# Patient Record
Sex: FEMALE | Race: ASIAN
[De-identification: names, ages, dates, MRNs, and addresses within clinical notes are randomized per-mention and may not be internally consistent; named-entity substitution may affect disease eponyms.]

---

## 2018-12-23 ENCOUNTER — HOSPITAL ENCOUNTER (EMERGENCY)
Dept: HOSPITAL 97 - ER | Age: 38
Discharge: HOME | End: 2018-12-23
Payer: COMMERCIAL

## 2018-12-23 DIAGNOSIS — N93.8: Primary | ICD-10-CM

## 2018-12-23 DIAGNOSIS — Z79.3: ICD-10-CM

## 2018-12-23 DIAGNOSIS — N85.9: ICD-10-CM

## 2018-12-23 LAB
BUN BLD-MCNC: 13 MG/DL (ref 7–18)
GLUCOSE SERPLBLD-MCNC: 108 MG/DL (ref 74–106)
HCT VFR BLD CALC: 32 % (ref 36–45)
LYMPHOCYTES # SPEC AUTO: 1.2 K/UL (ref 0.7–4.9)
PMV BLD: 8.2 FL (ref 7.6–11.3)
POTASSIUM SERPL-SCNC: 3.7 MMOL/L (ref 3.5–5.1)
RBC # BLD: 3.46 M/UL (ref 3.86–4.86)

## 2018-12-23 PROCEDURE — 81025 URINE PREGNANCY TEST: CPT

## 2018-12-23 PROCEDURE — 80048 BASIC METABOLIC PNL TOTAL CA: CPT

## 2018-12-23 PROCEDURE — 85025 COMPLETE CBC W/AUTO DIFF WBC: CPT

## 2018-12-23 PROCEDURE — 36415 COLL VENOUS BLD VENIPUNCTURE: CPT

## 2018-12-23 PROCEDURE — 81003 URINALYSIS AUTO W/O SCOPE: CPT

## 2018-12-23 PROCEDURE — 76830 TRANSVAGINAL US NON-OB: CPT

## 2018-12-23 PROCEDURE — 99284 EMERGENCY DEPT VISIT MOD MDM: CPT

## 2018-12-23 NOTE — RAD REPORT
EXAM DESCRIPTION:  US - Transvaginal Study Probe - 12/23/2018 9:20 am

 

CLINICAL HISTORY:  VAGINAL BLEEDING

Pelvic pain.

 

COMPARISON:  [facility] dated 9/6/2010

 

FINDINGS:  The uterus is normal in size, shape and echotexture. The uterus measures 8.4 x 5.1 x 4.9 c
m. An oblong 1-2 cm slightly complex circumscribed lesion is seen in the cervical canal.

 

The endometrial stripe measures 11 mm, mildly thickened.

 

Both ovaries are normal in size, shape and echotexture.  The right ovary measures 2.4 x 2.2 x 1.1 cm.
  The left ovary measures 2.3 x 1.2 x 1.2 cm. No ovarian or parovarian lesions. No adnexal masses.

 

Normal Doppler blood flow was demonstrated to both ovaries.

 

No significant pelvic ascites.

 

IMPRESSION:  An oblong 1-2 cm slightly complex rounded lesion in the cervical canal is noted.Differen
tial would include a cervical fibroid, polyp or mass. Followup direct visualization may be of value.

## 2018-12-23 NOTE — ER
Nurse's Notes                                                                                     

 [facility]                                                                

Name: Isabel Dietz                                                                               

Age: 38 yrs                                                                                       

Sex: Female                                                                                       

: 1980                                                                                   

MRN: [id number]                                                                                   

Arrival Date: 2018                                                                          

Time: 07:33                                                                                       

Account#: [id number]                                                                            

Bed 13                                                                                            

Private MD: Annia Rojas H                                                                    

Diagnosis: Dysfunctional Uterine Bleeding;Uterine Mass, NOS                                       

                                                                                                  

Presentation:                                                                                     

                                                                                             

07:47 Presenting complaint: Patient states: was put on birth control pills after having       iw  

      painful periods, started having bleeding for a month, was told by Dr. Hare to stop     

      pills for one week, yesterday was having heavier bleeding and mild cramping. Transition     

      of care: patient was not received from another setting of care. Onset of symptoms was       

      2018. Risk Assessment: Do you want to hurt yourself or someone else? Patient       

      reports no desire to harm self or others. Initial Sepsis Screen: Does the patient meet      

      any 2 criteria? No. Patient's initial sepsis screen is negative. Does the patient have      

      a suspected source of infection? No. Patient's initial sepsis screen is negative. Care      

      prior to arrival: None.                                                                     

07:47 Method Of Arrival: Ambulatory                                                           iw  

07:47 Acuity: MALCOLM 3                                                                           iw  

                                                                                                  

OB/GYN:                                                                                           

07:51 LMP 2018                                                                          iw  

                                                                                                  

Historical:                                                                                       

- Allergies:                                                                                      

07:51 NKA;                                                                                    iw  

- Home Meds:                                                                                      

07:51 L norgest/e.estradiol-e.estrad 0.15 mg-30 mcg (84)/10 mcg (7) oral 3MPk 1 tab once      iw  

      daily [Active];                                                                             

- PMHx:                                                                                           

07:51 None;                                                                                   iw  

                                                                                                  

- Immunization history:: Adult Immunizations not up to date.                                      

- Social history:: Smoking status: Patient/guardian denies using tobacco.                         

- Ebola Screening: : Patient negative for fever greater than or equal to 101.5 degrees            

  Fahrenheit, and additional compatible Ebola Virus Disease symptoms Patient denies               

  exposure to infectious person Patient denies travel to an Ebola-affected area in the            

  21 days before illness onset No symptoms or risks identified at this time.                      

                                                                                                  

                                                                                                  

Screenin:09 Abuse screen: Denies threats or abuse. Denies injuries from another. Nutritional        jl7 

      screening: No deficits noted. Tuberculosis screening: No symptoms or risk factors           

      identified. Fall Risk IV access (20 points). Total Baker Fall Scale indicates No Risk       

      (0-24 pts).                                                                                 

                                                                                                  

Assessment:                                                                                       

08:09 General: Appears in no apparent distress. uncomfortable, Behavior is calm, cooperative, jl7 

      appropriate for age. Pain: Complains of pain in suprapubic area, right lower quadrant       

      and left lower quadrant Pain does not radiate. Pain currently is 0 out of 10 on a pain      

      scale. at worst was 8 out of 10 on a pain scale. Quality of pain is described as            

      crampy, Is intermittent. Neuro: Level of Consciousness is awake, alert, obeys commands,     

      Oriented to person, place, time, situation. Cardiovascular: Patient's skin is warm and      

      dry. Respiratory: Airway is patent Respiratory effort is even, unlabored, Respiratory       

      pattern is regular, symmetrical. GI: No signs and/or symptoms were reported involving       

      the gastrointestinal system. : pt denies burning with urination Reports vaginal           

      bleeding that is bright red, with clots, heavy flow since x 1 month. EENT: Derm: Skin       

      is pink, warm \T\ dry. Musculoskeletal: No signs and/or symptoms reported regarding the     

      musculoskeletal system.                                                                     

09:00 Reassessment: Patient appears in no apparent distress at this time. No changes from     jl7 

      previously documented assessment. Patient and/or family updated on plan of care and         

      expected duration. Pain level reassessed. Patient is alert, oriented x 3, equal             

      unlabored respirations, skin warm/dry/pink.                                                 

10:00 Reassessment: Patient appears in no apparent distress at this time. Patient and/or      jl7 

      family updated on plan of care and expected duration. Pain level reassessed. Patient is     

      alert, oriented x 3, equal unlabored respirations, skin warm/dry/pink.                      

11:00 Reassessment: Patient appears in no apparent distress at this time. Patient and/or      jl7 

      family updated on plan of care and expected duration. Pain level reassessed. Patient is     

      alert, oriented x 3, equal unlabored respirations, skin warm/dry/pink.                      

                                                                                                  

Vital Signs:                                                                                      

07:51  / 63; Pulse 79; Resp 16; Temp 97.8(TE); Pulse Ox 100% on R/A; Weight 48.99 kg;   iw  

      Height 5 ft. 1 in. (154.94 cm); Pain 0/10;                                                  

09:18 BP 91 / 58; Pulse 76; Resp 12; Pulse Ox 98% on R/A;                                     mh5 

12:07 BP 98 / 68; Pulse 75; Resp 16 S; Pulse Ox 99% on R/A;                                   jl7 

07:51 Body Mass Index 20.41 (48.99 kg, 154.94 cm)                                               

                                                                                                  

ED Course:                                                                                        

07:33 Patient arrived in ED.                                                                  mr  

07:33 Annia Rojas DO is Private Physician.                                               mr  

07:45 Agus Laboy RN is Primary Nurse.                                                      jl7 

07:45 Jose Hdez MD is Attending Physician.                                             ps1 

07:50 Triage completed.                                                                       iw  

07:51 Arm band placed on.                                                                     iw  

08:09 Patient has correct armband on for positive identification. Bed in low position. Call   jl7 

      light in reach. Side rails up X 1. Pulse ox on. NIBP on. Warm blanket given.                

08:09 Initial lab(s) drawn, by me, sent to lab. Inserted saline lock: 20 gauge in right       jl7 

      antecubital area, using aseptic technique. Blood collected.                                 

09:17 Ultrasound completed. Patient tolerated well.                                           sg3 

09:18 Transvaginal Study Probe In Process Unspecified.                                        EDMS

09:34 Urine Pregnancy--Ancillary Sent.                                                        5 

09:34 Urine Dipstick-Ancillary Sent.                                                          5 

09:43 Urine Pregnancy--Ancillary (enter results) Sent.                                        5 

09:43 Urine Dipstick--Ancillary (enter results) Sent.                                         5 

11:32 Robert Hare MD is Referral Physician.                                           ps1 

12:07 No provider procedures requiring assistance completed. IV discontinued, intact,         jl7 

      bleeding controlled, No redness/swelling at site. Pressure dressing applied.                

                                                                                                  

Administered Medications:                                                                         

No medications were administered                                                                  

                                                                                                  

                                                                                                  

Outcome:                                                                                          

11:34 Discharge ordered by MD.                                                                ps1 

12:07 Discharged to home ambulatory.                                                          jl7 

12:07 Condition: stable                                                                           

12:07 Discharge instructions given to patient, Instructed on discharge instructions, follow       

      up and referral plans. medication usage, Demonstrated understanding of instructions,        

      follow-up care, medications, Prescriptions given X 1.                                       

12:12 Patient left the ED.                                                                    jl7 

                                                                                                  

Signatures:                                                                                       

Dispatcher [employer]                           Janay Corodva                                                                                    

Maryam Maurice, RN                     RN                                                      

Monique Jerez                              5                                                  

Agus Laboy, PORTILLO                        RN   7                                                  

Jose Hdez MD MD   ps1                                                  

Desiree Zhao                               sg3                                                  

                                                                                                  

**************************************************************************************************

## 2018-12-23 NOTE — EDPHYS
Physician Documentation                                                                           

 [facility]                                                                

Name: Isabel Dietz                                                                               

Age: 38 yrs                                                                                       

Sex: Female                                                                                       

: 1980                                                                                   

MRN: [id number]                                                                                   

Arrival Date: 2018                                                                          

Time: 07:33                                                                                       

Account#: [id number]                                                                            

Bed 13                                                                                            

Private MD: Annia Rojas H                                                                    

ED Physician Jose Hdez                                                                      

HPI:                                                                                              

                                                                                             

07:57 This 38 yrs old  Female presents to ER via Ambulatory with complaints of Vaginal   ps1 

      Bleeding.                                                                                   

07:57 Pt of Dr. Hare was started on a levonorgestrel taper. Improved initially, now      ps1 

      having increased bleeding. Worse over the last couple of days. Now changing pads every      

      2 hours. States she has been lightheaded. Non-painful. .                                    

                                                                                                  

OB/GYN:                                                                                           

07:51 LMP 2018                                                                          iw  

                                                                                                  

Historical:                                                                                       

- Allergies:                                                                                      

07:51 NKA;                                                                                    iw  

- Home Meds:                                                                                      

07:51 L norgest/e.estradiol-e.estrad 0.15 mg-30 mcg (84)/10 mcg (7) oral 3MPk 1 tab once      iw  

      daily [Active];                                                                             

- PMHx:                                                                                           

07:51 None;                                                                                   iw  

                                                                                                  

- Immunization history:: Adult Immunizations not up to date.                                      

- Social history:: Smoking status: Patient/guardian denies using tobacco.                         

- Ebola Screening: : Patient negative for fever greater than or equal to 101.5 degrees            

  Fahrenheit, and additional compatible Ebola Virus Disease symptoms Patient denies               

  exposure to infectious person Patient denies travel to an Ebola-affected area in the            

  21 days before illness onset No symptoms or risks identified at this time.                      

                                                                                                  

                                                                                                  

ROS:                                                                                              

07:57 Constitutional: Negative for fever, chills, and weight loss, Eyes: Negative for injury, ps1 

      pain, redness, and discharge, Cardiovascular: Negative for chest pain, palpitations,        

      and edema, Respiratory: Negative for shortness of breath, cough, wheezing, and              

      pleuritic chest pain, Abdomen/GI: Negative for abdominal pain, nausea, vomiting,            

      diarrhea, and constipation, Back: Negative for injury and pain, MS/Extremity: Negative      

      for injury and deformity, Skin: Negative for injury, rash, and discoloration, Psych:        

      Negative for depression, anxiety, suicide ideation, homicidal ideation, and                 

      hallucinations.                                                                             

07:57 : Positive for vaginal bleeding.                                                          

07:57 Neuro: Positive for lightheaded.                                                            

                                                                                                  

Exam:                                                                                             

07:57 Constitutional:  This is a well developed, well nourished patient who is awake, alert,  ps1 

      and in no acute distress. Head/Face:  Normocephalic, atraumatic. Eyes:  Pupils equal        

      round and reactive to light, extra-ocular motions intact.  Lids and lashes normal.          

      Conjunctiva and sclera are non-icteric and not injected. Chest/axilla:  Normal chest        

      wall appearance and motion.  Nontender with no deformity.  No lesions are appreciated.      

      Cardiovascular:  Regular rate and rhythm.  No gallops, murmurs, or rubs.  Normal PMI,       

      no JVD.  No pulse deficits. Respiratory:  Lungs have equal breath sounds bilaterally,       

      clear to auscultation and percussion.  No rales, rhonchi or wheezes noted.  No              

      increased work of breathing, no retractions or nasal flaring. Abdomen/GI:  Soft,            

      non-tender, with normal bowel sounds.  No distension or tympany.  No guarding or            

      rebound.  No evidence of tenderness throughout. Skin:  Warm, dry with normal turgor.        

      Normal color with no rashes, no lesions, and no evidence of cellulitis. MS/ Extremity:      

      Pulses equal, no cyanosis.  Neurovascular intact.  Full, normal range of motion. Neuro:     

       Awake and alert, GCS 15, oriented to person, place, time, and situation.  Cranial          

      nerves II-XII grossly intact. Sensory grossly intact. Psych:  Awake, alert, with            

      orientation to person, place and time.  Behavior, mood, and affect are within normal        

      limits.                                                                                     

                                                                                                  

Vital Signs:                                                                                      

07:51  / 63; Pulse 79; Resp 16; Temp 97.8(TE); Pulse Ox 100% on R/A; Weight 48.99 kg;   iw  

      Height 5 ft. 1 in. (154.94 cm); Pain 0/10;                                                  

09:18 BP 91 / 58; Pulse 76; Resp 12; Pulse Ox 98% on R/A;                                     mh5 

12:07 BP 98 / 68; Pulse 75; Resp 16 S; Pulse Ox 99% on R/A;                                   jl7 

07:51 Body Mass Index 20.41 (48.99 kg, 154.94 cm)                                             iw  

                                                                                                  

MDM:                                                                                              

08:19 Patient medically screened.                                                             ps1 

11:35 Data reviewed: vital signs, nurses notes, lab test result(s), radiologic studies, and   ps1 

      as a result, I will discharge patient. ED course: patient has undifferentiated mass in      

      uterus, likely fibroid. Needs hysteroscopy. Will rx Megace for DUB. Refer back to           

      Ananya. Hgb stable. Stable for discharge. .                                              

                                                                                                  

                                                                                             

07:57 Order name: Basic Metabolic Panel; Complete Time: 08:22                                 ps1 

                                                                                             

07:57 Order name: CBC with Diff; Complete Time: 08:19                                         ps1 

                                                                                             

08:25 Order name: Urine Dipstick--Ancillary (enter results)                                   ag  

                                                                                             

08:25 Order name: Urine Pregnancy--Ancillary (enter results)                                  ag  

                                                                                             

08:25 Order name: Urine Dipstick-Ancillary                                                    EDMS

                                                                                             

08:25 Order name: Urine Pregnancy--Ancillary                                                  EDMS

                                                                                             

07:57 Order name: Urine Pregnancy Test (obtain specimen); Complete Time: 08:25                ps1 

                                                                                             

07:57 Order name: IV Saline Lock; Complete Time: 08:06                                        ps1 

                                                                                             

07:57 Order name: Labs collected and sent; Complete Time: 08:06                               ps1 

                                                                                             

07:57 Order name: NPO; Complete Time: 08:06                                                   ps1 

                                                                                             

07:57 Order name: Urine Dipstick-Ancillary (obtain specimen); Complete Time: 08:25            ps1 

                                                                                             

08:13 Order name: Transvaginal Study Probe; Complete Time: 11:28                              EDMS

                                                                                                  

Administered Medications:                                                                         

No medications were administered                                                                  

                                                                                                  

                                                                                                  

Disposition:                                                                                      

18 11:34 Discharged to Home. Impression: Dysfunctional Uterine Bleeding, Uterine Mass,      

  NOS.                                                                                            

- Condition is Stable.                                                                            

- Discharge Instructions: Uterine Fibroids, Dysfunctional Uterine Bleeding.                       

                                                                                                  

- Medication Reconciliation Form, Thank You Letter, Antibiotic Education, Prescription            

  Opioid Use form.                                                                                

- Follow up: Robert Hare MD; When: 1 week; Reason: Further diagnostic work-up,            

  Recheck today's complaints, Continuance of care. Follow up: Emergency Department;               

  When: As needed; Reason: Worsening of condition.                                                

- Problem is an ongoing problem.                                                                  

- Symptoms have worsened.                                                                         

                                                                                                  

                                                                                                  

                                                                                                  

Signatures:                                                                                       

Dispatcher [employer]                           EDMS                                                 

Maryam Maurice, RN                     Agus Deshpande RN                        RN   jl7                                                  

Jose Hdez MD MD   ps1                                                  

                                                                                                  

Corrections: (The following items were deleted from the chart)                                    

08:13 07:57 Transvaginal Ob+US.RAD.BRZ ordered. EDMS                                          EDMS

12:12 11:34 2018 11:34 Discharged to Home. Impression: Dysfunctional Uterine Bleeding;  jl7 

      Uterine Mass, NOS. Condition is Stable. Forms are Medication Reconciliation Form, Thank     

      You Letter, Antibiotic Education, Prescription Opioid Use. Follow up: Robert Hare; When: 1 week; Reason: Further diagnostic work-up, Recheck today's                

      complaints, Continuance of care. Follow up: Emergency Department; When: As needed;          

      Reason: Worsening of condition. Problem is an ongoing problem. Symptoms have worsened.      

      ps1                                                                                         

                                                                                                  

**************************************************************************************************

## 2018-12-27 LAB
HCT VFR BLD CALC: 25.7 % (ref 36–45)
LYMPHOCYTES # SPEC AUTO: 1.7 K/UL (ref 0.7–4.9)
PMV BLD: 8.3 FL (ref 7.6–11.3)
RBC # BLD: 2.78 M/UL (ref 3.86–4.86)

## 2018-12-27 NOTE — PREOPHP
Date of Admission:  2018



History Of Present Illness:  Ms. Dietz is a 38-year-old [place] female,  3, para 3-0-0-3, 
who will be admitted for treatment of menorrhagia, prolapsed uterine leiomyomata.  Ms. Dietz has had
 some difficulties with increasing cramping and discomfort, was placed on oral contraceptives for thi
s, did not seem to respond, went to the emergency room.  Ultrasound through them showed now a prolaps
ed uterine leiomyomata.  This is confirmed on office exam.  Because of this, she will be admitted for
 excision of prolapsed leiomyomata.  Hysteroscopy, D and C of the uterine endometrium.



Past Medical History:  Includes 3 prior vaginal deliveries without complications.  She has also had b
reast augmentation surgery and no other hospitalizations, accidents, illnesses, injuries.  She has be
en on oral contraceptives recently and Megace through the emergency room, although this has not made 
much of a difference in her bleeding.  She is started on iron supplementation recently.



Allergies:  SHE HAS NO KNOWN ALLERGIES.



Social History:  She does not smoke.



Family History:  Noncontributory.



Review of Systems:

She reports no recent cough, cold, fever, or chills.  No recent nausea or vomiting.  She denies seein
g breast lumps or breast knots.  She denies any bowel or bladder issues.  Other than vaginal bleeding
, she has no other major complaints.



Physical Examination:

General:  Petite [place] female, in no apparent distress. 

Neck:  Supple without adenopathy or thyromegaly. 

Lungs:  Clear. 

Cardiac:  Regular rate and rhythm without murmurs. 

Breasts:  Not examined. 

Abdomen:  Nontender without organosplenomegaly. 

Pelvic:  Normal female external genitalia.  Vaginal wall is pink, rugated.  Blood present.  Speculum 
exam reveals prolapsed uterine leiomyomata.  Bimanual, no abnormalities other than leiomyomata. 

Extremities:  No cyanosis, clubbing, or edema.



Impression:  Menorrhagia, probably secondary to prolapsed leiomyomata.



Plan:  The patient will be admitted for excision of leiomyomata, hysteroscopy, D and C.  Risks and be
nefits are discussed, and she has signed operative permit in my presence.





JUANITA/LAURY

DD:  2018 09:05:36Voice ID:  [id number]

## 2018-12-28 ENCOUNTER — HOSPITAL ENCOUNTER (OUTPATIENT)
Dept: HOSPITAL 97 - OR | Age: 38
Discharge: HOME | End: 2018-12-28
Attending: SPECIALIST
Payer: COMMERCIAL

## 2018-12-28 DIAGNOSIS — N92.0: ICD-10-CM

## 2018-12-28 DIAGNOSIS — D25.9: Primary | ICD-10-CM

## 2018-12-28 DIAGNOSIS — D64.9: ICD-10-CM

## 2018-12-28 PROCEDURE — 36415 COLL VENOUS BLD VENIPUNCTURE: CPT

## 2018-12-28 PROCEDURE — 88305 TISSUE EXAM BY PATHOLOGIST: CPT

## 2018-12-28 PROCEDURE — 84703 CHORIONIC GONADOTROPIN ASSAY: CPT

## 2018-12-28 PROCEDURE — 85025 COMPLETE CBC W/AUTO DIFF WBC: CPT

## 2018-12-28 PROCEDURE — 0UDB7ZX EXTRACTION OF ENDOMETRIUM, VIA NATURAL OR ARTIFICIAL OPENING, DIAGNOSTIC: ICD-10-PCS

## 2018-12-28 PROCEDURE — 0UB97ZZ EXCISION OF UTERUS, VIA NATURAL OR ARTIFICIAL OPENING: ICD-10-PCS

## 2018-12-28 NOTE — P.BOP
Preoperative diagnosis: Prolapsed lieomyomata, menorrhagia, anemia


Postoperative diagnosis: same


Primary procedure: Excision of myoma, curretage of endometrium


Estimated blood loss: Less than 5ml


Specimen: myoma, endometrial currettings


Anesthesia: General


Complications: None


Transferred to: Recovery Room


Condition: Good

## 2018-12-28 NOTE — DS
Date of Discharge:  12/28/2018



Final Hospital Discharge Diagnosis:  Prolapsed uterine leiomyomata with menorrhagia.



Complications:  None.



Procedures:  Excision of leiomyomata, curettage of the endometrium.



Hospital Course:  The patient is a 38-year-old, , [place] female, who underwent excision of
 prolapsed uterine leiomyomata and curettage of the endometrium for menorrhagia.  She was dismissed t
o be seen back in my office in 2 weeks with usual post D and C activity restrictions.  Lab work inclu
ded a negative serum pregnancy test. Admission hemoglobin and hematocrit of 8.9 and 25.7.  She was di
smissed to take Integra iron supplement twice daily.





JUANITA/LAURY

DD:  12/28/2018 11:59:28Voice ID:  [id number]

DT:  12/28/2018 22:28:27Report ID:  [id number]

## 2018-12-28 NOTE — OP
Surgeon:  Robert Hare MD



Preoperative Diagnosis:  Prolapsed leiomyomata with menorrhagia.



Procedure:  Excision of myoma, curettage of uterine endometrium.



Postoperative Diagnosis:  Prolapsed leiomyomata with menorrhagia.



Description Of Procedure:  After satisfactory level of general anesthesia was obtained, the patient w
as prepped and draped in the usual fashion in high leg holders.  A weighted speculum placed in poster
ior vagina, cervix, anterior retraction revealed large prolapsed leiomyomata.  A Kalani clamp was donta
jannet around the stalk above the myoma.  This was excised.  A stay suture of 2-0 Vicryl was placed.  Cu
rettage of the endometrium, productive minimal tissue, suture cut, allowing tail extending beyond the
 cervix, in case bleeding had been encountered.  The patient was awakened and taken to recovery room 
in satisfactory condition.  Sponge and needle counts correct x2.



Anesthesia:  

1.KATT Jesus.

2.Dr. Edwin Sutherland. 

Ms. Garcia received doxycycline 200 mg IV piggyback for antibiotic prophylaxis.





JUANITA/LAURY

DD:  12/28/2018 11:59:28Voice ID:  [id number]

DT:  12/28/2018 22:08:30Report ID:  [id number]